# Patient Record
Sex: MALE | Race: WHITE | HISPANIC OR LATINO | Employment: STUDENT | URBAN - METROPOLITAN AREA
[De-identification: names, ages, dates, MRNs, and addresses within clinical notes are randomized per-mention and may not be internally consistent; named-entity substitution may affect disease eponyms.]

---

## 2017-02-01 ENCOUNTER — HOSPITAL ENCOUNTER (EMERGENCY)
Facility: MEDICAL CENTER | Age: 22
End: 2017-02-01
Attending: EMERGENCY MEDICINE
Payer: COMMERCIAL

## 2017-02-01 VITALS
RESPIRATION RATE: 16 BRPM | HEART RATE: 65 BPM | OXYGEN SATURATION: 97 % | HEIGHT: 72 IN | DIASTOLIC BLOOD PRESSURE: 78 MMHG | WEIGHT: 167.55 LBS | BODY MASS INDEX: 22.69 KG/M2 | SYSTOLIC BLOOD PRESSURE: 126 MMHG | TEMPERATURE: 97.2 F

## 2017-02-01 DIAGNOSIS — S05.01XA CORNEAL ABRASION, RIGHT, INITIAL ENCOUNTER: ICD-10-CM

## 2017-02-01 PROCEDURE — 99284 EMERGENCY DEPT VISIT MOD MDM: CPT

## 2017-02-01 PROCEDURE — 700111 HCHG RX REV CODE 636 W/ 250 OVERRIDE (IP): Performed by: EMERGENCY MEDICINE

## 2017-02-01 PROCEDURE — 96360 HYDRATION IV INFUSION INIT: CPT

## 2017-02-01 RX ORDER — SODIUM CHLORIDE 9 MG/ML
1000 INJECTION, SOLUTION INTRAVENOUS ONCE
Status: COMPLETED | OUTPATIENT
Start: 2017-02-01 | End: 2017-02-01

## 2017-02-01 RX ORDER — HYDROCODONE BITARTRATE AND ACETAMINOPHEN 5; 325 MG/1; MG/1
1 TABLET ORAL EVERY 6 HOURS PRN
Qty: 10 TAB | Refills: 0 | Status: SHIPPED | OUTPATIENT
Start: 2017-02-01

## 2017-02-01 RX ADMIN — SODIUM CHLORIDE 1000 ML: 9 INJECTION, SOLUTION INTRAVENOUS at 20:28

## 2017-02-01 ASSESSMENT — ENCOUNTER SYMPTOMS
CHILLS: 0
EYE PAIN: 1
FEVER: 0

## 2017-02-01 ASSESSMENT — LIFESTYLE VARIABLES: DO YOU DRINK ALCOHOL: NO

## 2017-02-01 NOTE — ED AVS SNAPSHOT
After Visit Summary                                                                                                                Edgardo Crain   MRN: [id number]    Department:  [facility], Emergency Dept   Date of Visit:  2/1/2017            [facility], Emergency Dept    [address]    [place] [address]    Phone:  [phone]      You were seen by     Osvaldo Goldberg D.O.      Your Diagnosis Was     Corneal abrasion, right, initial encounter     S05.01XA       These are the medications you received during your hospitalization from 02/01/2017 1847 to 02/01/2017 2143     Date/Time Order Dose Route Action    02/01/2017 2028 NS (BOLUS) infusion 1,000 mL 1,000 mL Intravenous Given      Follow-up Information     1. Follow up with Adalberto Ya M.D..    Specialty:  Ophthalmology    Contact information    [facility]  [facility] [address] [phone]        Medication Information     Review all of your home medications and newly ordered medications with your primary doctor and/or pharmacist as soon as possible. Follow medication instructions as directed by your doctor and/or pharmacist.     Please keep your complete medication list with you and share with your physician. Update the information when medications are discontinued, doses are changed, or new medications (including over-the-counter products) are added; and carry medication information at all times in the event of emergency situations.               Medication List      START taking these medications        Instructions    hydrocodone-acetaminophen 5-325 MG Tabs per tablet   Commonly known as:  NORCO    Take 1 Tab by mouth every 6 hours as needed.   Dose:  1 Tab               Procedures and tests performed during your visit     ER EYE BOX    MICAELA LENS    SLIT LAMP EXAM        Discharge Instructions       Corneal Abrasion  The cornea is the clear covering at the front and center of  the eye. When looking at the colored portion of the eye (iris), you are looking through the cornea. This very thin tissue is made up of many layers. The surface layer is a single layer of cells (corneal epithelium) and is one of the most sensitive tissues in the body. If a scratch or injury causes the corneal epithelium to come off, it is called a corneal abrasion. If the injury extends to the tissues below the epithelium, the condition is called a corneal ulcer.  CAUSES   · Scratches.  · Trauma.  · Foreign body in the eye.  Some people have recurrences of abrasions in the area of the original injury even after it has healed (recurrent erosion syndrome). Recurrent erosion syndrome generally improves and goes away with time.  SYMPTOMS   · Eye pain.  · Difficulty or inability to keep the injured eye open.  · The eye becomes very sensitive to light.  · Recurrent erosions tend to happen suddenly, first thing in the morning, usually after waking up and opening the eye.  DIAGNOSIS   Your health care provider can diagnose a corneal abrasion during an eye exam. Dye is usually placed in the eye using a drop or a small paper strip moistened by your tears. When the eye is examined with a special light, the abrasion shows up clearly because of the dye.  TREATMENT   · Small abrasions may be treated with antibiotic drops or ointment alone.  · A pressure patch may be put over the eye. If this is done, follow your doctor's instructions for when to remove the patch. Do not drive or use machines while the eye patch is on. Judging distances is hard to do with a patch on.  If the abrasion becomes infected and spreads to the deeper tissues of the cornea, a corneal ulcer can result. This is serious because it can cause corneal scarring. Corneal scars interfere with light passing through the cornea and cause a loss of vision in the involved eye.  HOME CARE INSTRUCTIONS  · Use medicine or ointment as directed. Only take over-the-counter or  prescription medicines for pain, discomfort, or fever as directed by your health care provider.  · Do not drive or operate machinery if your eye is patched. Your ability to  distances is impaired.  · If your health care provider has given you a follow-up appointment, it is very important to keep that appointment. Not keeping the appointment could result in a severe eye infection or permanent loss of vision. If there is any problem keeping the appointment, let your health care provider know.  SEEK MEDICAL CARE IF:   · You have pain, light sensitivity, and a scratchy feeling in one eye or both eyes.  · Your pressure patch keeps loosening up, and you can blink your eye under the patch after treatment.  · Any kind of discharge develops from the eye after treatment or if the lids stick together in the morning.  · You have the same symptoms in the morning as you did with the original abrasion days, weeks, or months after the abrasion healed.  MAKE SURE YOU:   · Understand these instructions.  · Will watch your condition.  · Will get help right away if you are not doing well or get worse.     This information is not intended to replace advice given to you by your health care provider. Make sure you discuss any questions you have with your health care provider.     Document Released: 12/15/2001 Document Revised: 12/23/2014 Document Reviewed: 08/25/2014  [employer] Interactive Patient Education ©2016 [employer]  Eye Foreign Body  A foreign body refers to any object on the surface of the eye or in the eyeball that should not be there. A foreign body may be a small speck of dirt or dust, a hair or eyelash, a splinter, or any other object.   SIGNS AND SYMPTOMS  Symptoms depend on what the foreign body is and where it is in the eye. The most common locations are:   · On the inner surface of the upper or lower eyelids or on the covering of the white part of the eye (conjunctiva). Symptoms in this location are:  ¨ Pain and  "irritation, especially when blinking.  ¨ The feeling that something is in the eye.  · On the surface of the clear covering on the front of the eye (cornea). Symptoms in this location include:  ¨ Pain and irritation.    ¨ Small \"rust rings\" around a metallic foreign body.  ¨ The feeling that something is in the eye.    · Inside the eyeball. Foreign bodies inside the eye may cause:    ¨ Great pain.    ¨ Immediate loss of vision.    ¨ Distortion of the pupil.  DIAGNOSIS   Foreign bodies are found during an exam by an eye specialist. Those on the eyelids, conjunctiva, or cornea are usually (but not always) easily found. When a foreign body is inside the eyeball, a cloudiness of the lens (cataract) may form almost right away. This makes it hard for an eye specialist to find the foreign body. Tests may be needed, including ultrasound testing, X-rays, and CT scans.  TREATMENT   · Foreign bodies on the eyelids, conjunctiva, or cornea are often removed easily and painlessly.  · Rust in the cornea may require the use of a drill-like instrument to remove the rust.   · If the foreign body has caused a scratch or a rubbing or scraping (abrasion) of the cornea, this may be treated with antibiotic drops or ointment. A pressure patch may be put over your eye.  · If the foreign body is inside your eyeball, surgery is needed right away. This is a medical emergency. Foreign bodies inside the eye threaten vision. A person may even lose his or her eye.  HOME CARE INSTRUCTIONS   · Take medicines only as directed by your health care provider. Use eye drops or ointment as directed.  · If no eye patch was applied:   ¨ Keep your eye closed as much as possible.  ¨ Do not rub your eye.  ¨ Wear dark glasses as needed to protect your eyes from bright light.  ¨ Do not wear contact lenses until your eye feels normal again, or as instructed by your health care provider.  ¨ Wear a protective eye covering if there is a risk of eye injury. This is " important when working with high-speed tools.  · If your eye is patched:  ¨ Follow your health care provider's instructions for when to remove the patch.  ¨ Do not drive or operate machinery if your eye is patched. Your ability to  distances is impaired.  · Keep all follow-up visits as directed by your health care provider. This is important.  SEEK MEDICAL CARE IF:   · You have increased pain in your eye.  · Your vision gets worse.    · You have problems with your eye patch.    · You have fluid (discharge) coming from your injured eye.    · You have redness and swelling around your affected eye.    MAKE SURE YOU:   · Understand these instructions.  · Will watch your condition.  · Will get help right away if you are not doing well or get worse.     This information is not intended to replace advice given to you by your health care provider. Make sure you discuss any questions you have with your health care provider.     Document Released: 12/18/2006 Document Revised: 01/08/2016 Document Reviewed: 05/15/2014  [employer] Interactive Patient Education ©2016 [employer]            Patient Information     Patient Information    Following emergency treatment: all patient requiring follow-up care must return either to a private physician or a clinic if your condition worsens before you are able to obtain further medical attention, please return to the emergency room.     Billing Information    At [facility], we work to make the billing process streamlined for our patients.  Our Representatives are here to answer any questions you may have regarding your hospital bill.  If you have insurance coverage and have supplied your insurance information to us, we will submit a claim to your insurer on your behalf.  Should you have any questions regarding your bill, we can be reached online or by phone as follows:  Online: You are able pay your bills online or live chat with our representatives about any billing questions you  may have. We are here to help Monday - Friday from 8:00am to 7:30pm and 9:00am - 12:00pm on Saturdays.  Please visit [email or web address]  for more information.   Phone:  [phone] or [phone]    Please note that your emergency physician, surgeon, pathologist, radiologist, anesthesiologist, and other specialists are not employed by [facility] and will therefore bill separately for their services.  Please contact them directly for any questions concerning their bills at the numbers below:     Emergency Physician Services:  [phone]  [facility]:  [phone]  [facility]:  [phone]  [facility]:  [phone]    1. Your final bill may vary from the amount quoted upon discharge if all procedures are not complete at that time, or if your doctor has additional procedures of which we are not aware. You will receive an additional bill if you return to the Emergency Department at [facility] for suture removal regardless of the facility of which the sutures were placed.     2. Please arrange for settlement of this account at the emergency registration.    3. All self-pay accounts are due in full at the time of treatment.  If you are unable to meet this obligation then payment is expected within 4-5 days.     4. If you have had radiology studies (CT, X-ray, Ultrasound, MRI), you have received a preliminary result during your emergency department visit. Please contact the radiology department [phone] to receive a copy of your final result. Please discuss the Final result with your primary physician or with the follow up physician provided.     Crisis Hotline:  [place] Crisis Hotline:  1-154-VILGYUD or 1-519.436.9340  [place] Crisis Hotline:    1-378.812.2933 or 424-376-0138         ED Discharge Follow Up Questions    1. In order to provide you with very good care, we would like to follow up with a phone call in the next  few days.  May we have your permission to contact you?     YES /  NO    2. What is the best phone number to call you? (       )_____-__________    3. What is the best time to call you?      Morning  /  Afternoon  /  Evening                   Patient Signature:  ____________________________________________________________    Date:  ____________________________________________________________

## 2017-02-01 NOTE — ED AVS SNAPSHOT
2/1/2017          Edgardo Crain  [address] [place] [address]    Dear Edgardo:    [facility] wants to ensure your discharge home is safe and you or your loved ones have had all your questions answered regarding your care after you leave the hospital.    You may receive a telephone call within two days of your discharge.  This call is to make certain you understand your discharge instructions as well as ensure we provided you with the best care possible during your stay with us.     The call will only last approximately 3-5 minutes and will be done by a nurse.    Once again, we want to ensure your discharge home is safe and that you have a clear understanding of any next steps in your care.  If you have any questions or concerns, please do not hesitate to contact us, we are here for you.  Thank you for choosing [facility] for your healthcare needs.    Sincerely,    Nikhil Moreno    [facility]

## 2017-02-01 NOTE — ED AVS SNAPSHOT
Shareablee Access Code: [email or web address]  Expires: 3/3/2017  9:43 PM    Shareablee  A secure, online tool to manage your health information     [employer]’s Shareablee® is a secure, online tool that connects you to your personalized health information from the privacy of your home -- day or night - making it very easy for you to manage your healthcare. Once the activation process is completed, you can even access your medical information using the Shareablee prakash, which is available for free in the Apple Prakash store or Google Play store.     Shareablee provides the following levels of access (as shown below):   My Chart Features   [facility] Primary Care Doctor [facility]  Specialists [facility]  Urgent  Care Non-[facility]  Primary Care  Doctor   Email your healthcare team securely and privately 24/7 X X X X   Manage appointments: schedule your next appointment; view details of past/upcoming appointments X      Request prescription refills. X      View recent personal medical records, including lab and immunizations X X X X   View health record, including health history, allergies, medications X X X X   Read reports about your outpatient visits, procedures, consult and ER notes X X X X   See your discharge summary, which is a recap of your hospital and/or ER visit that includes your diagnosis, lab results, and care plan. X X       How to register for Shareablee:  1. Go to  [email or web address].  2. Click on the Sign Up Now box, which takes you to the New Member Sign Up page. You will need to provide the following information:  a. Enter your Shareablee Access Code exactly as it appears at the top of this page. (You will not need to use this code after you’ve completed the sign-up process. If you do not sign up before the expiration date, you must request a new code.)   b. Enter your date of birth.   c. Enter your home email address.   d. Click Submit, and follow the next screen’s instructions.  3. Create a Shareablee ID. This will be your Shareablee  login ID and cannot be changed, so think of one that is secure and easy to remember.  4. Create a [employer] password. You can change your password at any time.  5. Enter your Password Reset Question and Answer. This can be used at a later time if you forget your password.   6. Enter your e-mail address. This allows you to receive e-mail notifications when new information is available in [employer].  7. Click Sign Up. You can now view your health information.    For assistance activating your [employer] account, call [phone]

## 2017-02-02 NOTE — ED NOTES
Pt discharged with 1 prescription to have filled, no IV in place and female friend will drive him home

## 2017-02-02 NOTE — ED NOTES
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 Noted pt's eye to be swollen and tearful. Pt states pain is at 4/10.

## 2017-02-02 NOTE — DISCHARGE INSTRUCTIONS
Corneal Abrasion  The cornea is the clear covering at the front and center of the eye. When looking at the colored portion of the eye (iris), you are looking through the cornea. This very thin tissue is made up of many layers. The surface layer is a single layer of cells (corneal epithelium) and is one of the most sensitive tissues in the body. If a scratch or injury causes the corneal epithelium to come off, it is called a corneal abrasion. If the injury extends to the tissues below the epithelium, the condition is called a corneal ulcer.  CAUSES   · Scratches.  · Trauma.  · Foreign body in the eye.  Some people have recurrences of abrasions in the area of the original injury even after it has healed (recurrent erosion syndrome). Recurrent erosion syndrome generally improves and goes away with time.  SYMPTOMS   · Eye pain.  · Difficulty or inability to keep the injured eye open.  · The eye becomes very sensitive to light.  · Recurrent erosions tend to happen suddenly, first thing in the morning, usually after waking up and opening the eye.  DIAGNOSIS   Your health care provider can diagnose a corneal abrasion during an eye exam. Dye is usually placed in the eye using a drop or a small paper strip moistened by your tears. When the eye is examined with a special light, the abrasion shows up clearly because of the dye.  TREATMENT   · Small abrasions may be treated with antibiotic drops or ointment alone.  · A pressure patch may be put over the eye. If this is done, follow your doctor's instructions for when to remove the patch. Do not drive or use machines while the eye patch is on. Judging distances is hard to do with a patch on.  If the abrasion becomes infected and spreads to the deeper tissues of the cornea, a corneal ulcer can result. This is serious because it can cause corneal scarring. Corneal scars interfere with light passing through the cornea and cause a loss of vision in the involved eye.  HOME CARE  INSTRUCTIONS  · Use medicine or ointment as directed. Only take over-the-counter or prescription medicines for pain, discomfort, or fever as directed by your health care provider.  · Do not drive or operate machinery if your eye is patched. Your ability to  distances is impaired.  · If your health care provider has given you a follow-up appointment, it is very important to keep that appointment. Not keeping the appointment could result in a severe eye infection or permanent loss of vision. If there is any problem keeping the appointment, let your health care provider know.  SEEK MEDICAL CARE IF:   · You have pain, light sensitivity, and a scratchy feeling in one eye or both eyes.  · Your pressure patch keeps loosening up, and you can blink your eye under the patch after treatment.  · Any kind of discharge develops from the eye after treatment or if the lids stick together in the morning.  · You have the same symptoms in the morning as you did with the original abrasion days, weeks, or months after the abrasion healed.  MAKE SURE YOU:   · Understand these instructions.  · Will watch your condition.  · Will get help right away if you are not doing well or get worse.     This information is not intended to replace advice given to you by your health care provider. Make sure you discuss any questions you have with your health care provider.     Document Released: 12/15/2001 Document Revised: 12/23/2014 Document Reviewed: 08/25/2014  [employer] Interactive Patient Education ©2016 [employer]  Eye Foreign Body  A foreign body refers to any object on the surface of the eye or in the eyeball that should not be there. A foreign body may be a small speck of dirt or dust, a hair or eyelash, a splinter, or any other object.   SIGNS AND SYMPTOMS  Symptoms depend on what the foreign body is and where it is in the eye. The most common locations are:   · On the inner surface of the upper or lower eyelids or on the covering of  "the white part of the eye (conjunctiva). Symptoms in this location are:  ¨ Pain and irritation, especially when blinking.  ¨ The feeling that something is in the eye.  · On the surface of the clear covering on the front of the eye (cornea). Symptoms in this location include:  ¨ Pain and irritation.    ¨ Small \"rust rings\" around a metallic foreign body.  ¨ The feeling that something is in the eye.    · Inside the eyeball. Foreign bodies inside the eye may cause:    ¨ Great pain.    ¨ Immediate loss of vision.    ¨ Distortion of the pupil.  DIAGNOSIS   Foreign bodies are found during an exam by an eye specialist. Those on the eyelids, conjunctiva, or cornea are usually (but not always) easily found. When a foreign body is inside the eyeball, a cloudiness of the lens (cataract) may form almost right away. This makes it hard for an eye specialist to find the foreign body. Tests may be needed, including ultrasound testing, X-rays, and CT scans.  TREATMENT   · Foreign bodies on the eyelids, conjunctiva, or cornea are often removed easily and painlessly.  · Rust in the cornea may require the use of a drill-like instrument to remove the rust.   · If the foreign body has caused a scratch or a rubbing or scraping (abrasion) of the cornea, this may be treated with antibiotic drops or ointment. A pressure patch may be put over your eye.  · If the foreign body is inside your eyeball, surgery is needed right away. This is a medical emergency. Foreign bodies inside the eye threaten vision. A person may even lose his or her eye.  HOME CARE INSTRUCTIONS   · Take medicines only as directed by your health care provider. Use eye drops or ointment as directed.  · If no eye patch was applied:   ¨ Keep your eye closed as much as possible.  ¨ Do not rub your eye.  ¨ Wear dark glasses as needed to protect your eyes from bright light.  ¨ Do not wear contact lenses until your eye feels normal again, or as instructed by your health care " provider.  ¨ Wear a protective eye covering if there is a risk of eye injury. This is important when working with high-speed tools.  · If your eye is patched:  ¨ Follow your health care provider's instructions for when to remove the patch.  ¨ Do not drive or operate machinery if your eye is patched. Your ability to  distances is impaired.  · Keep all follow-up visits as directed by your health care provider. This is important.  SEEK MEDICAL CARE IF:   · You have increased pain in your eye.  · Your vision gets worse.    · You have problems with your eye patch.    · You have fluid (discharge) coming from your injured eye.    · You have redness and swelling around your affected eye.    MAKE SURE YOU:   · Understand these instructions.  · Will watch your condition.  · Will get help right away if you are not doing well or get worse.     This information is not intended to replace advice given to you by your health care provider. Make sure you discuss any questions you have with your health care provider.     Document Released: 12/18/2006 Document Revised: 01/08/2016 Document Reviewed: 05/15/2014  [employer] Interactive Patient Education ©2016 [employer]

## 2017-02-02 NOTE — ED PROVIDER NOTES
ED Provider Note    Scribed for Osvaldo Goldberg D.O. by Tracey Cabrera. 2/1/2017  7:16 PM    Primary care provider: Pcp Pt States None  Means of arrival: Private vehicle  History obtained from: Patient  History limited by: None    CHIEF COMPLAINT  Chief Complaint   Patient presents with   • Eye Pain     right       HPI  Edgardo Crain is a 21 y.o. male who presents to the Emergency Department due to right eye pain. 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 His eye is currently swollen. He rates the pain as a 4/10 in severity at this time. Patient attempted to remove the metal by flushing the eye with water, but his pain was not alleviated. He denies fever or chills.     REVIEW OF SYSTEMS  Review of Systems   Constitutional: Negative for fever and chills.   Eyes: Positive for pain (right).        Positive right eye swelling.        PAST MEDICAL HISTORY       SURGICAL HISTORY  patient denies any surgical history    SOCIAL HISTORY  Social History   Substance Use Topics   • Smoking status: Current Some Day Smoker -- 0.25 packs/day for 1 years     Types: Cigarettes   • Smokeless tobacco: None   • Alcohol Use: No      History   Drug Use No       FAMILY HISTORY  History reviewed. No pertinent family history.    CURRENT MEDICATIONS  Home Medications     Reviewed by Lena Reece R.N. (Registered Nurse) on 02/01/17 at 1910  Med List Status: Complete    Medication Last Dose Status          Patient Brian Taking any Medications                        ALLERGIES  No Known Allergies    PHYSICAL EXAM  VITAL SIGNS: /60 mmHg  Pulse 60  Temp(Src) 36.2 °C (97.2 °F)  Resp 14  Ht 1.829 m (6')  Wt 76 kg (167 lb 8.8 oz)  BMI 22.72 kg/m2  SpO2 100%    Constitutional: No distress. Well nourished.  HENT: Head is atraumatic. Oropharynx is moist.   Eyes: Conjunctivae are normal. EOMI. PERRLA. No scleral injection. No noted foreign body.    Respiratory: No respiratory distress. Equal chest expansion.   Musculoskeletal: Normal range of motion. No edema.   Neurological: Alert. No focal deficits noted.    Skin: No rash. No Pallor.   Psych: Appropriate for clinical situation. Normal affect.    COURSE & MEDICAL DECISION MAKING  Nursing notes, VS, PMSFHx reviewed in chart.    7:16 PM - Patient seen and examined at bedside. I will examine the eye with the slit lamp.     Procedures:  Slit lamp exam:   proparacaine drops used.  Fluorescein dye placed.  Mild abrasions noted to right upper cornea.  No ulcerations.    Lids/lashes flipped and swept.  Perrl. Eomi.  Pt tolerated well.  Eye irrigated with one liter of normal saline.     9:26 PM  The patient has had 1 L of normal saline via a Randy lens to the right eye. I do not appreciate any foreign body noted. Mild corneal abrasion to the right upper portion of the eye with fluorescein uptake. At this time, I will place the patient on pain medications, give him some erythromycin ointment, and have him follow-up with ophthalmology. He has no vision changes and is agreeable with the plan.    9:44 PM Patient rechecked; I informed him of presence of a corneal abrasion. He will be discharged home Patient understands and agrees.     The patient will return for new or worsening symptoms and is stable at the time of discharge.    The patient is referred to a primary physician for blood pressure management, diabetic screening, and for all other preventative health concerns.    Patient's blood pressure was elevated, I believe it is likely secondary to medical condition. However I have advised home monitoring and if it continues to be 120/80 or higher, advised to followup with primary care physician for blood pressure management.      Pt was given erythromycin from the eye kit.     DISPOSITION:  Patient will be discharged home in stable condition.    FOLLOW UP:  Adalberto Ya M.D.  [address] [facility] [address]  [place]  [facility] [address]  [phone]            OUTPATIENT MEDICATIONS:  New Prescriptions    HYDROCODONE-ACETAMINOPHEN (NORCO) 5-325 MG TAB PER TABLET    Take 1 Tab by mouth every 6 hours as needed.         FINAL IMPRESSION  1. Corneal abrasion, right, initial encounter          Tracey BEDOLLA (Scribe), am scribing for, and in the presence of, Osvaldo Goldberg D.O..    Electronically signed by: Tracey Cabrera (Scribe), 2/1/2017    Osvaldo BEDOLLA D.O. personally performed the services described in this documentation, as scribed by Tracey Cabrera in my presence, and it is both accurate and complete.    The note accurately reflects work and decisions made by me.  Osvaldo Goldberg  2/2/2017  12:10 AM

## 2017-02-02 NOTE — ED NOTES
22 y/o male ambulate to triage   Chief Complaint   Patient presents with   • Eye Pain     right     Pt states he was wielding metal at school/[employer]  He did flush his eye with water

## 2017-02-02 NOTE — ED NOTES
Eye irrigation with NS started without current discomfort. Pt laying on bed on Right side with female friend bedside